# Patient Record
Sex: MALE | Race: WHITE
[De-identification: names, ages, dates, MRNs, and addresses within clinical notes are randomized per-mention and may not be internally consistent; named-entity substitution may affect disease eponyms.]

---

## 2018-08-26 ENCOUNTER — HOSPITAL ENCOUNTER (INPATIENT)
Dept: HOSPITAL 80 - FED | Age: 53
LOS: 3 days | Discharge: HOME | DRG: 684 | End: 2018-08-29
Attending: INTERNAL MEDICINE | Admitting: INTERNAL MEDICINE
Payer: COMMERCIAL

## 2018-08-26 DIAGNOSIS — T39.391A: ICD-10-CM

## 2018-08-26 DIAGNOSIS — T79.6XXA: ICD-10-CM

## 2018-08-26 DIAGNOSIS — E86.0: ICD-10-CM

## 2018-08-26 DIAGNOSIS — R10.13: ICD-10-CM

## 2018-08-26 DIAGNOSIS — N17.9: Primary | ICD-10-CM

## 2018-08-26 DIAGNOSIS — R00.1: ICD-10-CM

## 2018-08-26 DIAGNOSIS — R74.0: ICD-10-CM

## 2018-08-26 DIAGNOSIS — I10: ICD-10-CM

## 2018-08-26 DIAGNOSIS — R21: ICD-10-CM

## 2018-08-26 DIAGNOSIS — Z88.0: ICD-10-CM

## 2018-08-26 LAB
CK SERPL-CCNC: 612 IU/L (ref 0–224)
INR PPP: 0.99 (ref 0.83–1.16)
PLATELET # BLD: 284 10^3/UL (ref 150–400)
PROTHROMBIN TIME: 13.3 SEC (ref 12–15)

## 2018-08-26 PROCEDURE — G0472 HEP C SCREEN HIGH RISK/OTHER: HCPCS

## 2018-08-26 PROCEDURE — G0378 HOSPITAL OBSERVATION PER HR: HCPCS

## 2018-08-26 RX ADMIN — SODIUM CHLORIDE SCH MLS: 900 INJECTION, SOLUTION INTRAVENOUS at 23:12

## 2018-08-26 RX ADMIN — SODIUM CHLORIDE SCH MLS: 900 INJECTION, SOLUTION INTRAVENOUS at 16:14

## 2018-08-26 RX ADMIN — HEPARIN SODIUM SCH UNIT: 5000 INJECTION, SOLUTION INTRAVENOUS; SUBCUTANEOUS at 21:19

## 2018-08-26 RX ADMIN — HEPARIN SODIUM SCH UNIT: 5000 INJECTION, SOLUTION INTRAVENOUS; SUBCUTANEOUS at 15:03

## 2018-08-26 NOTE — GHP
[f 
rep st]



                                                            HISTORY AND PHYSICAL





DATE OF ADMISSION:  08/26/2018



CHIEF COMPLAINT:  Nausea, fatigue.



HISTORY OF PRESENT ILLNESS:  A pleasant 53-year-old male with no past medical 
history, presenting with fatigue and nausea for the past week.  He participated 
in [employer] last weekend. Saturday he completed 100 mile run.  The 
week prior, he completed 100 mile mountain bike ride, followed by a 10K.  The 
day he completed the race, he went home and felt fairly well, but then was 
overcome by fatigue.  Was sleeping up to 20 hours a day for 2 days.  
Subsequently developed intermittent nausea. Was still eating but decreased 
amount of food intake.  He presented to the ER today because he just felt out 
of it.  He thinks he may have had a subjective fever. 



He has no noticed increased urination since the race.  He was well hydrated 
during the run.  He took sixteen, 200 mg Advil in a 24-hour period.  Prior to 
that, was using 2 tabs a couple times a week.  Noted a rash from his shins to 
his upper thighs and wrists to upper arms.  After the race, it was itchy and 
mildly red.  Improved with what sounds like a steroid cream.  Denies joint 
swelling, redness or pain chronically.  No chills, sweats, or diarrhea.



REVIEW OF SYSTEMS:  I completed a 10-point review of systems, negative except 
as noted in the HPI.



PAST MEDICAL HISTORY:  None.



PAST SURGICAL HISTORY:  He had neck surgery as a teenager.



FAMILY HISTORY:  Paternal grandfather with heart disease.



SOCIAL HISTORY:  Lives in [place] with his wife and daughter.  No tobacco, 
alcohol, or illicits.



PHYSICAL EXAMINATION:  VITAL SIGNS:  Temperature 36.4, blood pressure 140/81, 
heart rate 40-50, respirations 12, 96% on room air. GENERAL: Well-appearing male
, thin, no acute distress. HEENT: PERRLA.  Moist mucous membranes.  CV:  Hao, 
but regular. No lower extremity edema LUNGS: Clear.  No crackles or wheezing .  
ABDOMEN:  Soft, nontender, nondistended.  Positive bowel sounds. : No Browne. 
MUSCULOSKELETAL: 5/5 upper lower extremity strength. NEURO: 2 through 12 
intact. PSYCH: Alert and oriented x3.



LABORATORY DATA:  WBC 9, hemoglobin 15, hematocrit 44, platelets 284.  Coags 
within normal. Sodium 140, potassium 5.3, chloride 106, carbon dioxide 25, BUN 
is 92, creatinine is 3.6.  No old to compare.  Glucose 103, calcium 9.8, 
phosphorus 5.4, AST 60, .  CK is 612, CKMB. Total protein 6.5, albumin 4
, lipase 770. 



Abdominal ultrasound pending.  



Chest x-ray personally reviewed by me.  No edema or pneumonia.



ASSESSMENT AND PLAN:  

1.  Acute kidney injury:multifactorial with strenuous exercise, NSAIDs, and 
dehydration.  Dr. Guidry with Nephrology has consulted.  Immunologic studies are 
pending with reported rash, though described as poison ivy and not chronic 
joint swelling, pain  Ultrasound is pending. IVFs

2.  Accelerated hypertension. p.r.n. hydralazine.

3.  Rash:  resolved.  Sounds like poison ivy or oak.

4.  Rhabdomyolysis. mild. Likely worse earlier this week. Continue IVFs  

5.  Transaminitis.  May have been hypotensive at some point.  Ultrasound is 
pending.

6.  Hyperkalemia:  Minimally elevated.  Will repeat. 

7. Elevated lipase: denies abd pain. ADAT, IVFs. If progresses, check U/S

8. Diet: Low potassium.

9  Deep venous thrombosis prophylaxis: Subcutaneous heparin.

10. Symptomatic uremia: tx nausea with PRN antiemetics





.  Disposition.  Patient warrants observation admission given acute renal 
failure warranting serial labs, ultrasound.





Job #:  [id number]

MTDD

## 2018-08-26 NOTE — GCON
[f rep st]



                                                                    CONSULTATION





RENAL CONSULTATION.



REASON FOR CONSULTATION:  Acute kidney injury.



HISTORY OF PRESENT ILLNESS:  The patient is a 53-year-old with no past medical history who presents w
ith acute renal failure, new onset.  The patient rans the [employer] last week.  He completed the 
race and was doing well that time, though he about 16 pills of ibuprofen to help him get through the 
discomfort.  He had some Achilles pain that had been nagging him.  After the race for the next 2 days
, he slept about 20 hours.  He also had a couple of episodes of nausea and vomiting.  He had some dec
reased oral intake during this time.  He continue to drink some fluids and he continued to have urine
 output and actually noted some polyuria, going about every hour and a half to 2 hours.  He had signi
ficant swelling to his lower extremities, which has since resolved.  He also noted a rash on his expo
sed extremities, which was red, splotchy, itchy, and has since resolved.  This did occur in any areas
 that were covered by his clothing.  He also some nasal congestion which has since resolved. 



Currently, he denies any fevers, chills, headache, shortness of breath, or chest pain.  He experience
d an episode of vomiting in the ER today.  He denies any flank pain.  He does have some mild epigastr
ic pain.  He denies any dysuria or hematuria.  Denies any bowel dysfunction.  He had some muscle pain
 after the pain, more so in his quads, which he attributes to an accommodation that he made in his ru
nning style for the Achilles pain.  He denies any muscle pain that was out of proportion to what he m
ight expect after running this race.  He denies any particularly painful, cool, or pale extremities. 
 He has not taken any NSAIDs since the event.  He has intermittently taken it in the past.  He denies
 any recent use of antibiotics, no exposure to any contrast.  He denies any known history of any hepa
titis or autoimmune disease.



PAST MEDICAL HISTORY:  Denies.



PAST SURGICAL HISTORY:  Denies.



HOME MEDICATIONS:  

None.



ALLERGIES:  Penicillin.



SOCIAL HISTORY:  Nonsmoker.  Rare alcohol.



REVIEW OF SYSTEMS:  Ten systems reviewed and negative, except as per HPI.



PHYSICAL EXAMINATION:  VITAL SIGNS:  Temperature 36.7, heart rate 45, respirations 15, blood pressure
 154/91.  GENERAL:  Awake, alert and oriented x3, in no acute distress, except when he had the episod
e of emesis.  HEENT:  Extraocular muscles intact.  Mucosal membranes somewhat dry.  NECK:  Supple.  N
o JVD.  PULMONARY:  Clear to auscultation bilaterally.  CV:  Mild bradycardia, regular rhythm.  No mu
rmurs, rubs, or gallops.  ABDOMEN:  Mild tenderness in the epigastrium.  Otherwise nontender.  Positi
ve bowel sounds.  BACK:  No flank pain or CVA tenderness.  EXTREMITIES:  No clubbing, cyanosis, or ed
ema.  SKIN:  No rash or lesion.  MUSCULOSKELETAL:  Range of motion appears normal NEURO:  Cranial ner
ves 2 through 12 grossly intact.



LABORATORY/IMAGING:  Sodium 140, potassium 5.3, chloride 106, bicarb 25, BUN 92, creatinine 3.6, calc
ium 9.8.  AST 60, .  .  Lipase 776.  Albumin 4.0.  White count 9.7, hemoglobin 15.5, pamela
telets 284.  INR 1.  Urinalysis:  2+ blood, negative protein, 10-15 rbc's, 1-3 wbc's.



ASSESSMENT/PLAN:  

1.  Acute kidney injury.  Presumably this is an acute episode with a described event of running the [email or web address] last week, followed by a couple of days of severe fatigue, some lower extremity edema, a
nd now presenting with elevated creatinine.  He took heavy nonsteroidal anti-inflammatory drugs durin
g the race, which may be contributory.  He also has labs now that are consistent mild rhabdomyolysis,
 possibly resolution of an earlier more severe degree.  He has mild elevation of ALT relative to AST,
 which could be from the rhabdomyolysis or possible hepatitis, we will check labs.  He has mild eleva
tion of lipase, could be consistent with pancreatitis.  As a result of that or otherwise due to his d
ecreased intake recently, he may have a prerenal azotemia as he has a high BUN; creatinine ratio.  He
 is currently being repleted with intravenous fluids.  Will check serologies given that there was marleen
e blood on the urinalysis and he did have a rash, although the rash would be atypical for vasculitis 
based on his description and is currently not present.  Also, there is no proteinuria on the urinalys
is, which would be less likely indicative of rapidly progressive glomerulonephritis.  Will check furt
her serologies, check renal ultrasound, quantify urine protein:creatinine ratio and check urine chemi
stries.  The differential is still somewhat broad and will be further defined by the patient's respon
se to suppose care and monitoring labs over the subsequent day or so.  There is no urgent need for he
modialysis at this time.

2.  Rhabdomyolysis.  Currently, CK is very mildly elevated at 612, but likely this has decreased from
 a potentially much higher level earlier as he ran 100 miles last week and was significantly fatigued
 with sore muscles afterwards.  Fortunately, the patient did not have any oliguria and reports making
 good urine throughout.  He will be placed on intravenous fluids to help mobilize any remaining myogl
obin and increase renal flow.

3.  Hyperkalemia.  This is very mild at 5.3 and likely due to the elevated creatinine.  He should be 
given a low potassium diet.  Hemodialysis is not needed at this time.  This could be treated medicall
y if it rises.  Avoid nonsteroidal anti-inflammatory drugs, which may have also exacerbated this.

4.  Pancreatitis.  Patient has moderate elevation of lipase, about 2-1/2 times the upper limit of nor
mal.  He also has some tender epigastrium.  He may need a brief period of being n.p.o. or being maddie
nued on fluids.  He will also receive supportive care for nausea and vomiting with Zofran. 



Thank you very much for this interesting consult.  We will follow with you.





Job #:  [id number]

## 2018-08-26 NOTE — EDPHY
H & P


Time Seen by Provider: 08/26/18 09:45


HPI/ROS: 





Chief complaint.  Fatigue, increased urination





HPI.  Patient is a 53-year-old male who finish the [employer] running race 

last weekend.  He had nausea vomiting the 1st 2 days.  He has had continued 

fatigue.  He has had frequent urination of clear urine without dysuria.  Due to 

training injuries he was taking lots of Advil during the race but not since.  

Maybe had a fever a few days ago.  No sore throat of cough though he did have 

some shortness of breath originally.  He has some crampy mid abdominal pain and 

now epigastric pain.  Decreased stool over the past week.  Normally healthy.  

No similar symptoms previously





ROS


Constitutional.  Fatigue


Eyes.  no problems with vision


ENT.  no sore throat, no nasal drainage


Cardiovascular.  no chest pain


Respiratory.  Congestion and shortness of breath


Abdominal.  Abdominal pain with nausea vomiting


.  Frequent urination


MS.  no calf pain/swelling, no neck/back pain, no joint pain


Skin.  no rash


Lymph.  no swollen glands


Neuro.  no headache, no dizziness, no difficulty walking or with speech


Past Medical/Surgical History: 





Healthy


Social History: 





, nonsmoker, no alcohol


Smoking Status: Never smoked


Physical Exam: 





General Appearance:  Alert well-developed male mild distress vital signs are 

stable


Eyes: Pupils equal and round no pallor or injection.


ENT, Mouth:  Mucous membranes are moist.


Respiratory:  There are no retractions, lungs are clear to auscultation.


Cardiovascular: Regular rate and rhythm.


Gastrointestinal:  Abdomen is soft with epigastric tenderness.  Some mild low 

abdominal tenderness.  No particular tenderness at McBurney's point.  No 

masses.  Normal bowel sounds


Neurological:  Awake and alert, sensory and motor exams grossly normal.


Skin: Warm and dry, no rashes.


Musculoskeletal:  Neck is supple nontender.


Extremities  symmetrical, full range of motion.


Psychiatric: Patient is oriented X 3, there is no agitation.


Constitutional: 


 Initial Vital Signs











Temperature (C)  36.7 C   08/26/18 09:29


 


Heart Rate  50 L  08/26/18 09:29


 


Respiratory Rate  15   08/26/18 09:29


 


Blood Pressure  145/93 H  08/26/18 09:29


 


O2 Sat (%)  98   08/26/18 09:29








 











O2 Delivery Mode               Room Air














Allergies/Adverse Reactions: 


 





Penicillins Allergy (Unknown, Verified 08/26/18 11:33)


 Other-Enter Comments








Home Medications: 














 Medication  Instructions  Recorded


 


NK [No Known Home Meds]  08/26/18














Medical Decision Making





- Diagnostics


Imaging Results: 


 Imaging Impressions





Chest X-Ray  08/26/18 10:23


Impression:


1. No focal pneumonia.


2. No pneumothorax.








Chest x-ray interpreted by me is normal


Procedures: 





IV normal saline


ED Course/Re-evaluation: 





The patient in wife and I discussed imaging and lab results.  We discussed 

treatment plan including recommendation for admission.  They expressed 

understanding and agreement





I consulted and discussed the case with Nephrology due to the patient's acute 

renal failure and rhabdomyolysis.  They recommend fluid resuscitation.





I consulted discussed case with Dr. Gibson, hospitalist who agrees to the 

admission


Differential Diagnosis: 





I considered intestinal ischemia.  The patient has pancreatitis.  He has acute 

renal failure.  He has rhabdomyolysis.  No evidence for pneumonia





- Data Points


Laboratory Results: 


 Laboratory Results





 08/26/18 09:50 





 08/26/18 09:50 





 











  08/26/18 08/26/18 08/26/18





  10:55 09:50 09:50


 


WBC      





    


 


RBC      





    


 


Hgb      





    


 


Hct      





    


 


MCV      





    


 


MCH      





    


 


MCHC      





    


 


RDW      





    


 


Plt Count      





    


 


MPV      





    


 


Neut % (Auto)      





    


 


Lymph % (Auto)      





    


 


Mono % (Auto)      





    


 


Eos % (Auto)      





    


 


Baso % (Auto)      





    


 


Nucleat RBC Rel Count      





    


 


Absolute Neuts (auto)      





    


 


Absolute Lymphs (auto)      





    


 


Absolute Monos (auto)      





    


 


Absolute Eos (auto)      





    


 


Absolute Basos (auto)      





    


 


Absolute Nucleated RBC      





    


 


Immature Gran %      





    


 


Immature Gran #      





    


 


PT      13.3 SEC SEC





     (12.0-15.0) 


 


INR      0.99 





     (0.83-1.16) 


 


APTT      29.7 SEC SEC





     (23.0-38.0) 


 


Sodium      





    


 


Potassium      





    


 


Chloride      





    


 


Carbon Dioxide      





    


 


Anion Gap      





    


 


BUN      





    


 


Creatinine      





    


 


Estimated GFR      





    


 


Glucose      





    


 


Calcium      





    


 


Total Bilirubin    0.7 mg/dL mg/dL  





    (0.1-1.4)  


 


Conjugated Bilirubin    0.1 mg/dL mg/dL  





    (0.0-0.5)  


 


Unconjugated Bilirubin    0.6 mg/dL mg/dL  





    (0.0-1.1)  


 


AST    60 IU/L H IU/L  





    (17-59)  


 


ALT    361 IU/L H IU/L  





    (21-72)  


 


Alkaline Phosphatase    57 IU/L IU/L  





    ()  


 


Creatine Kinase      





    


 


CK-MB (CK-2) Fraction      





    


 


CK-MB (CK-2) %      





    


 


Creatine Kinase Interp      





    


 


Total Protein    6.5 g/dL g/dL  





    (6.3-8.2)  


 


Albumin    4.0 g/dL g/dL  





    (3.5-5.0)  


 


Lipase      





    


 


Urine Color  PALE YELLOW     





    


 


Urine Appearance  CLEAR     





    


 


Urine pH  5.0     





   (5.0-7.5)   


 


Ur Specific Gravity  1.010     





   (1.002-1.030)   


 


Urine Protein  NEGATIVE     





   (NEGATIVE)   


 


Urine Ketones  NEGATIVE     





   (NEGATIVE)   


 


Urine Blood  2+  H     





   (NEGATIVE)   


 


Urine Nitrate  NEGATIVE     





   (NEGATIVE)   


 


Urine Bilirubin  NEGATIVE     





   (NEGATIVE)   


 


Urine Urobilinogen  NEGATIVE EU EU    





   (0.2-1.0)   


 


Ur Leukocyte Esterase  NEGATIVE     





   (NEGATIVE)   


 


Urine RBC  10-15 /hpf H /hpf    





   (0-3)   


 


Urine WBC  1-3 /hpf /hpf    





   (0-3)   


 


Ur Epithelial Cells  NONE SEEN /lpf /lpf    





   (NONE-1+)   


 


Urine Mucus  TRACE /lpf /lpf    





   (NONE-1+)   


 


Urine Glucose  NEGATIVE     





   (NEGATIVE)   














  08/26/18 08/26/18





  09:50 09:50


 


WBC    9.71 10^3/uL H 10^3/uL





    (3.80-9.50) 


 


RBC    5.10 10^6/uL 10^6/uL





    (4.40-6.38) 


 


Hgb    15.5 g/dL g/dL





    (13.7-17.5) 


 


Hct    44.1 % %





    (40.0-51.0) 


 


MCV    86.5 fL fL





    (81.5-99.8) 


 


MCH    30.4 pg pg





    (27.9-34.1) 


 


MCHC    35.1 g/dL g/dL





    (32.4-36.7) 


 


RDW    12.9 % %





    (11.5-15.2) 


 


Plt Count    284 10^3/uL 10^3/uL





    (150-400) 


 


MPV    9.1 fL fL





    (8.7-11.7) 


 


Neut % (Auto)    75.6 % H %





    (39.3-74.2) 


 


Lymph % (Auto)    10.2 % L %





    (15.0-45.0) 


 


Mono % (Auto)    10.8 % %





    (4.5-13.0) 


 


Eos % (Auto)    2.2 % %





    (0.6-7.6) 


 


Baso % (Auto)    0.6 % %





    (0.3-1.7) 


 


Nucleat RBC Rel Count    0.0 % %





    (0.0-0.2) 


 


Absolute Neuts (auto)    7.34 10^3/uL H 10^3/uL





    (1.70-6.50) 


 


Absolute Lymphs (auto)    0.99 10^3/uL L 10^3/uL





    (1.00-3.00) 


 


Absolute Monos (auto)    1.05 10^3/uL H 10^3/uL





    (0.30-0.80) 


 


Absolute Eos (auto)    0.21 10^3/uL 10^3/uL





    (0.03-0.40) 


 


Absolute Basos (auto)    0.06 10^3/uL 10^3/uL





    (0.02-0.10) 


 


Absolute Nucleated RBC    0.00 10^3/uL 10^3/uL





    (0-0.01) 


 


Immature Gran %    0.6 % %





    (0.0-1.1) 


 


Immature Gran #    0.06 10^3/uL 10^3/uL





    (0.00-0.10) 


 


PT    





   


 


INR    





   


 


APTT    





   


 


Sodium  140 mEq/L mEq/L  





   (135-145)  


 


Potassium  5.3 mEq/L H mEq/L  





   (3.3-5.0)  


 


Chloride  106 mEq/L mEq/L  





   ()  


 


Carbon Dioxide  25 mEq/l mEq/l  





   (22-31)  


 


Anion Gap  9 mEq/L mEq/L  





   (8-16)  


 


BUN  92 mg/dL H mg/dL  





   (7-23)  


 


Creatinine  3.6 mg/dL H mg/dL  





   (0.7-1.3)  


 


Estimated GFR  18   





   


 


Glucose  103 mg/dL H mg/dL  





   ()  


 


Calcium  9.8 mg/dL mg/dL  





   (8.5-10.4)  


 


Total Bilirubin    





   


 


Conjugated Bilirubin    





   


 


Unconjugated Bilirubin    





   


 


AST    





   


 


ALT    





   


 


Alkaline Phosphatase    





   


 


Creatine Kinase  612 IU/L H IU/L  





   (0-224)  


 


CK-MB (CK-2) Fraction  9.71 ng/mL H ng/mL  





   (0.00-4.55)  


 


CK-MB (CK-2) %  1.6 % %  





   (0.0-4.0)  


 


Creatine Kinase Interp  NEGATIVE   





   (NEGATIVE)  


 


Total Protein    





   


 


Albumin    





   


 


Lipase  776 IU/L H IU/L  





   ()  


 


Urine Color    





   


 


Urine Appearance    





   


 


Urine pH    





   


 


Ur Specific Gravity    





   


 


Urine Protein    





   


 


Urine Ketones    





   


 


Urine Blood    





   


 


Urine Nitrate    





   


 


Urine Bilirubin    





   


 


Urine Urobilinogen    





   


 


Ur Leukocyte Esterase    





   


 


Urine RBC    





   


 


Urine WBC    





   


 


Ur Epithelial Cells    





   


 


Urine Mucus    





   


 


Urine Glucose    





   











Medications Given: 


 





Heparin Sodium (Porcine) (Heparin Sc Injection)  5,000 unit SC Q8 KATHRIN


   Stop: 02/22/19 13:59


   Last Admin: 08/26/18 15:03 Dose:  5,000 unit


Sodium Chloride (Ns)  1,000 mls @ 125 mls/hr IV CONT KATHRIN


   Stop: 02/22/19 15:59


   Last Admin: 08/26/18 16:14 Dose:  1,000 mls





Discontinued Medications





Sodium Chloride (Ns)  1,000 mls @ 0 mls/hr IV EDNOW ONE; Wide Open


   PRN Reason: Protocol


   Stop: 08/26/18 10:23


   Last Admin: 08/26/18 10:27 Dose:  1,000 mls


Sodium Chloride (Ns)  1,000 mls @ 0 mls/hr IV EDNOW ONE; Wide Open


   PRN Reason: Protocol


   Stop: 08/26/18 11:20


   Last Admin: 08/26/18 11:27 Dose:  1,000 mls


Ondansetron HCl (Zofran)  4 mg IVP EDNOW ONE


   Stop: 08/26/18 11:53


   Last Admin: 08/26/18 11:55 Dose:  4 mg








Departure





- Departure


Disposition: [facility] In[facility] Acute


Clinical Impression: 


Rhabdomyolysis


Qualifiers:


 Rhabdomyolysis type: traumatic Encounter type: initial encounter Qualified Code

(s): T79.6XXA - Traumatic ischemia of muscle, initial encounter





Renal failure, acute


Qualifiers:


 Acute renal failure type: unspecified Qualified Code(s): N17.9 - Acute kidney 

failure, unspecified





Pancreatitis


Qualifiers:


 Chronicity: acute Pancreatitis type: unspecified pancreatitis type Acute 

pancreatitis complication: unspecified Qualified Code(s): K85.90 - Acute 

pancreatitis without necrosis or infection, unspecified





Condition: Fair

## 2018-08-27 LAB
HAV AB SERPL IA-ACNC: (no result)
HAV AB SERPL IA-ACNC: (no result)
HEPATITIS B CORE AB IGM: NEGATIVE
HEPATITIS B CORE AB TOTAL: NEGATIVE
HEPATITIS B SURFACE ANTIGEN: NEGATIVE
HEPATITIS C ANTIBODY TOTAL: NEGATIVE

## 2018-08-27 RX ADMIN — HEPARIN SODIUM SCH UNIT: 5000 INJECTION, SOLUTION INTRAVENOUS; SUBCUTANEOUS at 14:28

## 2018-08-27 RX ADMIN — SODIUM CHLORIDE SCH MLS: 900 INJECTION, SOLUTION INTRAVENOUS at 16:19

## 2018-08-27 RX ADMIN — SODIUM CHLORIDE SCH MLS: 900 INJECTION, SOLUTION INTRAVENOUS at 07:14

## 2018-08-27 RX ADMIN — HEPARIN SODIUM SCH UNIT: 5000 INJECTION, SOLUTION INTRAVENOUS; SUBCUTANEOUS at 21:14

## 2018-08-27 RX ADMIN — HEPARIN SODIUM SCH UNIT: 5000 INJECTION, SOLUTION INTRAVENOUS; SUBCUTANEOUS at 05:49

## 2018-08-27 NOTE — PDMN
Medical Necessity


Medical necessity: [facility] musculoskeletal disease  Rhabdomyolysis 2 days:  N/V/

weakness- MACIEL- improving hydration with ongoing need for IVF, monitoring of 

renal status   BUN 92, elevated Cr. 3.6 on admit- improving, hyperkalemia 5.3, 

HTN, -transminitis-improving, pancreatitis- Lipase 776 - further monitoring and 

tx needed > 2 MN  status changed to INPT 8/27/18

## 2018-08-27 NOTE — ASMTCMCOM
CM Note

 

CM Note                       

Notes:

Spoke with Pt's RN. Pt admitted for rhabdomyolysis, ARF and pancreatitis due to participation in 

extreme [employer] foot race. Pt normally very athletic and anticipated to discharge home 

independently. No CM needs identified at this time. CM to follow should needs arise. 



D/C Plan: home independent

 

Date Signed:  08/27/2018 09:57 AM

Electronically Signed By:Irene Marley

## 2018-08-27 NOTE — SOAPPROG
SOAP Progress Note


Assessment/Plan: 


Assessment:





1. MACIEL


  Pt with MACIEL, transaminitis, rhabdo, hematuria without proteinuria, s/p 

[place] 100.


I suspect this is a combination of NSAIDs, ATN, and rhabdo. He appears to be 

responding to IVF.


One note; Burke did drink some fresh stream water without filtration during 

the race. 





For now, would continue IVF and follow. If his hematuria does not resolve, this 

will need further evaluation.


























Plan:





08/27/18 10:11





Subjective: 





Doing ok. Appetite improving. 


Objective: 





 Vital Signs











Temp Pulse Resp BP Pulse Ox


 


 36.9 C   41 L  19   128/70 H  95 


 


 08/27/18 07:29  08/27/18 07:29  08/27/18 07:29  08/27/18 07:29  08/27/18 07:29








 Laboratory Results





 08/27/18 04:20 





 











 08/26/18 08/27/18 08/28/18





 05:59 05:59 05:59


 


Intake Total  4450 


 


Balance  4450 








 











PT  13.3 SEC (12.0-15.0)   08/26/18  09:50    


 


INR  0.99  (0.83-1.16)   08/26/18  09:50    














Physical Exam





- Physical Exam


General Appearance: no apparent distress


Respiratory: lungs clear


Cardiac/Chest: regular rate, rhythm


Extremities: normal inspection


Neuro/Psych: oriented x 3





ICD10 Worksheet


Patient Problems: 


 Problems











Problem Status Onset


 


Pancreatitis Acute  


 


Renal failure, acute Acute  


 


Rhabdomyolysis Acute

## 2018-08-27 NOTE — HOSPPROG
Hospitalist Progress Note


Assessment/Plan: 


53y male with c/o n/v and weakness. First encounter, chart reviewed. 





# MACIEL


-improving with hydration


-NSAIDS, dehydration, s/p [place] 100





#Rhabdo


-cont IVF





#HTN


-better





#Rash


-resolved





#Transaminitis


-US stable


-improving





#Hyperkalemia


-stable


-follow





#Dispo


-unclear


-cont IVF


-check labs in am











Subjective: Feeling better. Eager to go home. Pain better.


Objective: 


 Vital Signs











Temp Pulse Resp BP Pulse Ox


 


 36.9 C   41 L  19   128/70 H  95 


 


 08/27/18 07:29  08/27/18 07:29  08/27/18 07:29  08/27/18 07:29  08/27/18 07:29








 Laboratory Results





 08/27/18 04:20 





 











 08/26/18 08/27/18 08/28/18





 05:59 05:59 05:59


 


Intake Total  4450 


 


Balance  4450 








 











PT  13.3 SEC (12.0-15.0)   08/26/18  09:50    


 


INR  0.99  (0.83-1.16)   08/26/18  09:50    














- Physical Exam


Constitutional: no apparent distress, appears nourished, not in pain


Eyes: PERRL, anicteric sclera, EOMI


Ears, Nose, Mouth, Throat: moist mucous membranes, hearing normal, ears appear 

normal


Cardiovascular: No JVD, No tachycardia, No edema


Respiratory: no respiratory distress, no rales or rhonchi, reduced air movement


Gastrointestinal: normoactive bowel sounds, No tenderness, No ascites


Skin: warm, normal color, No mottled


Musculoskeletal: normal joint ROM, no joint effusions, generalized weakness


Neurologic: AAOx3


Psychiatric: interacting appropriately, not anxious, not encephalopathic, 

thought process linear





ICD10 Worksheet


Patient Problems: 


 Problems











Problem Status Onset


 


Rhabdomyolysis Acute  


 


Renal failure, acute Acute  


 


Pancreatitis Acute

## 2018-08-28 RX ADMIN — SODIUM CHLORIDE SCH MLS: 900 INJECTION, SOLUTION INTRAVENOUS at 21:11

## 2018-08-28 RX ADMIN — HEPARIN SODIUM SCH UNIT: 5000 INJECTION, SOLUTION INTRAVENOUS; SUBCUTANEOUS at 14:35

## 2018-08-28 RX ADMIN — SODIUM CHLORIDE SCH MLS: 900 INJECTION, SOLUTION INTRAVENOUS at 14:05

## 2018-08-28 RX ADMIN — HEPARIN SODIUM SCH UNIT: 5000 INJECTION, SOLUTION INTRAVENOUS; SUBCUTANEOUS at 06:15

## 2018-08-28 RX ADMIN — HEPARIN SODIUM SCH UNIT: 5000 INJECTION, SOLUTION INTRAVENOUS; SUBCUTANEOUS at 21:11

## 2018-08-28 NOTE — SOAPPROG
SOAP Progress Note


Assessment/Plan: 


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 

thought to be 2/2 to NSAIDs/ATN, now improving.





MACIEL


-no h/o CKD


-BP's improved


-Cr down to 2.4


-continue IVF NS 125ml/hr for now


-ASO negative, other serologies pending


-K+ improved, may resume normal diet


-continue to monitor, may discharge pending primary team discretion


-will schedule f/u with [facility] in 4 weeks


-will sign off, please contact if ?'s #[phone]








08/28/18 13:48





Subjective: 


Not recording UO but patient says making good urine.





Objective: 





 Vital Signs











Temp Pulse Resp BP Pulse Ox


 


 36.8 C   43 L  18   130/82 H  95 


 


 08/28/18 11:29  08/28/18 11:29  08/28/18 11:29  08/28/18 11:29  08/28/18 07:58








 Laboratory Results





 08/28/18 09:14 





 











 08/27/18 08/28/18 08/29/18





 05:59 05:59 05:59


 


Intake Total  850 1781


 


Output Total   0


 


Balance  850 1781








 











PT  13.3 SEC (12.0-15.0)   08/26/18  09:50    


 


INR  0.99  (0.83-1.16)   08/26/18  09:50    














Physical Exam





- Physical Exam


General Appearance: WD/WN, alert, no apparent distress


EENT: PERRL/EOMI


Neck: non-tender, full range of motion, normal inspection


Respiratory: chest non-tender, lungs clear, normal breath sounds


Cardiac/Chest: normal peripheral pulses, regular rate, rhythm


Abdomen: normal bowel sounds, non-tender, soft


Skin: normal color, warm/dry


Extremities: normal range of motion, non-tender


Neuro/Psych: no motor/sensory deficits, alert, normal mood/affect





ICD10 Worksheet


Patient Problems: 


 Problems











Problem Status Onset


 


Pancreatitis Acute  


 


Renal failure, acute Acute  


 


Rhabdomyolysis Acute

## 2018-08-29 VITALS — DIASTOLIC BLOOD PRESSURE: 79 MMHG | SYSTOLIC BLOOD PRESSURE: 143 MMHG

## 2018-08-29 RX ADMIN — HEPARIN SODIUM SCH: 5000 INJECTION, SOLUTION INTRAVENOUS; SUBCUTANEOUS at 14:45

## 2018-08-29 RX ADMIN — HEPARIN SODIUM SCH UNIT: 5000 INJECTION, SOLUTION INTRAVENOUS; SUBCUTANEOUS at 05:05

## 2018-08-29 RX ADMIN — SODIUM CHLORIDE SCH MLS: 900 INJECTION, SOLUTION INTRAVENOUS at 05:05

## 2018-08-29 NOTE — HOSPPROG
Hospitalist Progress Note


Assessment/Plan: 





Burke is 53y male with c/o n/v and weakness. Today is my first encounter w the 

patient, chart reviewed.





# MACIEL


-improving with hydration


-NSAIDS, dehydration, s/p [place] 100


-cont IVF





#Rhabdomyolysis


-resolved





#HTN


-better


-should get f/u with his PCP





#bradycardia


-patient is an athlete





#Rash


-resolved





#Transaminitis


-US stable





#hematuria


-if not completely resolved, will need f/u with PCP





#Hyperkalemia


-stable


-change to regular diet





#Dispo


dc home, repeat kidney function next week, bp cuff, ua











Subjective: Burke feels great, wants to leave.


Objective: 


 Vital Signs











Temp Pulse Resp BP Pulse Ox


 


 36.7 C   43 L  22 H  143/79 H  97 


 


 08/29/18 11:41  08/29/18 11:41  08/29/18 11:41  08/29/18 11:41  08/29/18 11:41








 Laboratory Results





 08/29/18 05:06 





 











 08/28/18 08/29/18 08/30/18





 05:59 05:59 05:59


 


Intake Total 850 3479 


 


Output Total  0 


 


Balance 850 3479 








 











PT  13.3 SEC (12.0-15.0)   08/26/18  09:50    


 


INR  0.99  (0.83-1.16)   08/26/18  09:50    














- Physical Exam


Constitutional: no apparent distress, appears nourished, not in pain


Eyes: PERRL


Ears, Nose, Mouth, Throat: hearing normal


Cardiovascular: regular rate and rhythym


Respiratory: no respiratory distress


Gastrointestinal: normoactive bowel sounds


Skin: warm


Musculoskeletal: full muscle strength


Neurologic: AAOx3





ICD10 Worksheet


Patient Problems: 


 Problems











Problem Status Onset


 


Pancreatitis Acute  


 


Renal failure, acute Acute  


 


Rhabdomyolysis Acute

## 2018-08-29 NOTE — GDS
[f 
rep st]



                                                             DISCHARGE SUMMARY





DISCHARGE DIAGNOSES:  

1.  Acute kidney injury.

2.  Rhabdomyolysis.

3.  Hypertension.

4.  Bradycardia.

5.  Rash.

6.  Transaminitis.

7.  Hematuria.

8.  Hyperkalemia.



CONSULTATION:  Dr. Brian Guidry with Nephrology.



HISTORY OF PRESENT ILLNESS:  Briefly, Burke Hussein is a 53-year-old male 
without any significant past medical history.  He presented with fatigue and 
nausea for the past week.  He participated in the [employer] race series last 
weekend and he completed the 100-mile run.  A week prior he completed a 100-
mile mountain bike ride followed by 10 km run.  He started having intermittent 
nausea.  He was eating but had a decreasing amount of food intake.  He was 
taking Advil because he was having pain to his upper thigh area.  It was noted 
on admission he was in acute renal failure.  He was seen and evaluated by Dr. Brian Guidry.  He was treated with IV fluids.  His kidney function has improved.  
Recommendation is for him to get this rechecked early next week by his primary 
care provider.



HOSPITAL COURSE:  

1.  Acute kidney injury.  This improved with hydration.  His creatinine is 2.1.
  I told him to avoid all NSAIDs and to get a kidney function checked early 
next week.

2.  Rhabdomyolysis, resolved.

3.  Hypertension.  His blood pressure has been elevated throughout his stay.  I 
have recommended that he get a blood pressure cuff check daily and take a copy 
of his results to his PCP.

4.  Bradycardia.  This is secondary from being fit.

5.  Rash, resolved.

6.  Transaminitis.  Ultrasound is stable.  Recommend that he get repeat LFTs 
early next week.

7.  Hematuria.  Recommend that he get a repeat UA with his PCP to make sure 
this is resolved.

8.  Hyperkalemia. Resolved.



DISCHARGE CONDITION:  Stable.  Blood pressure is 143/79, heart rate of 43, 
respiratory rate 22, O2 sats on room air 96%, temperature is 36.7 Celsius.



MEDICATIONS AT DISCHARGE:  Please see the EMR.



DISCHARGE INSTRUCTIONS:  

1.  To get repeat labs with his primary care provider early next week.

2.  To avoid all NSAIDs until his kidney function is completely improved and 
then to use them sparingly.



Copy requested to:

PCP not in dictionary



Job #:  [id number]

MTDD